# Patient Record
(demographics unavailable — no encounter records)

---

## 2025-05-19 NOTE — CONSULT LETTER
[Dear  ___] : Dear  [unfilled], [Please see my note below.] : Please see my note below. [Consult Closing:] : Thank you very much for allowing me to participate in the care of this patient.  If you have any questions, please do not hesitate to contact me. [Sincerely,] : Sincerely, [FreeTextEntry3] : ZACH Roth Certified Pediatric Nurse Practitioner  Pediatric Neurology  [facility]

## 2025-05-19 NOTE — PHYSICAL EXAM
[Well-appearing] : well-appearing [Normocephalic] : normocephalic [No dysmorphic facial features] : no dysmorphic facial features [No deformities] : no deformities [Alert] : alert [Well related, good eye contact] : well related, good eye contact [Conversant] : conversant [Normal speech and language] : normal speech and language [Follows instructions well] : follows instructions well [Full extraocular movements] : full extraocular movements [No nystagmus] : no nystagmus [Normal facial sensation to light touch] : normal facial sensation to light touch [No facial asymmetry or weakness] : no facial asymmetry or weakness [Gross hearing intact] : gross hearing intact [Equal palate elevation] : equal palate elevation [Good shoulder shrug] : good shoulder shrug [Normal tongue movement] : normal tongue movement [Normal axial and appendicular muscle tone] : normal axial and appendicular muscle tone [Gets up on table without difficulty] : gets up on table without difficulty [No pronator drift] : no pronator drift [Normal finger tapping and fine finger movements] : normal finger tapping and fine finger movements [No abnormal involuntary movements] : no abnormal involuntary movements [5/5 strength in proximal and distal muscles of arms and legs] : 5/5 strength in proximal and distal muscles of arms and legs [Walks and runs well] : walks and runs well [Good walking balance] : good walking balance [Normal gait] : normal gait [de-identified] : No respiratory distress noted.

## 2025-05-19 NOTE — ASSESSMENT
[FreeTextEntry1] : MARY is a 6-year-old girl with no PMHx. Presents to the office for difficulty concentrating and hyperactivity. Currently in 1st grade at [employer]. Attends a regular classroom without accommodations. Recently evaluated through the district, reports grade-level performance and denied an IEP. Whereas the school reports she is struggling, reads two levels behind, is easily distracted, and has difficulty completing tests on time. Non-focal exam. Will plan to do workup to r/o ADHD using [employer] forms and psychoeducational evaluation.

## 2025-05-19 NOTE — HISTORY OF PRESENT ILLNESS
[FreeTextEntry1] : MARY is a 6 year old female here for initial evaluation of inattention.   Early development: MARY was born full term via c section. she was discharged home with mother. she hit all early developmental milestones appropriately.  MARY attended day care program starting at 3 years old and then pre-school at age 4.     Educational assessment: Current Grade: 1st grade Current District: [facility] General ED/Any Accommodations/ICT in place: Currently in a regular classroom setting w/o accommodations in place.  Recently evaluated through district and doesn't qualify for IEP. District reports grade-level performance, whereas the school itself indicates MARY is struggling and reading 2 levels behind. Teachers report concerns regarding completing tests in a timely manner and MARY being easily distracted.   Home assessment: Lives at home w/ parents.   Inattention: Very easily distracted, needing frequent redirection, difficulty with multi-step instructions Hyperactivity: Fidgety Impulsivity:  Interrupts when others are speaking, has a hard time waiting. Will cry if something doesn't go her way.  Homework: She needs help and redirection w/ homework.    Social Concerns: -  Sleep: sleeps well Eating: eats a varied diet   Co- morbidities: No concern for depression, OCD. Can be anxious at times.  Other health concerns: Denies staring, twitching, seizure or seizure-like activity.   FAMILY HISTORY: Family history of ADHD: + Family history of anxiety or depression: +  Denies family history of cardiac conditions or early unexplained deaths.

## 2025-05-19 NOTE — PLAN
[FreeTextEntry1] : - Mother to send copy of most recent psychoeducational testing from school - [place] questionnaires given for parent and teacher -  Discussed use of Omega 3 fish oil and magnesium supplements.  - Discussed use of medications as well as side effects if accommodations do not improve school performance - Follow up 1 month to review [place] questionnaires as well as psychoeducational testing

## 2025-06-25 NOTE — PLAN
[FreeTextEntry1] : -  Discussed use of Omega 3 fish oil and magnesium supplements.  - Discussed use of medications as well as side effects if accommodations do not improve school performance - Discussed potential benefits of behavioral therapy - Letter for accommodations provided to parent. - Follow up 3 months.

## 2025-06-25 NOTE — HISTORY OF PRESENT ILLNESS
[FreeTextEntry1] : MARY is a 7 year old female here for f/u evaluation of inattention and hyperactivity.  Currently in 1st grade at [employer]. Attends a regular classroom without accommodations. Recently evaluated through the [employer], reports grade-level performance and denied an IEP.   f/u 06/25/2025 [place] forms reviewed:   Parents responses:  Inattention 4/9- (6/9).  +Hyperactivity 6/9 (6/9)  ODD: 0/8. (4/8) 19-26  Conduct disorder: 0/14 (3/14) 27-40 + Anxiety/ Depression: 3/7 (3/7) Overall performance:    Teachers responses: +Inattention 7/9- (6/9). + Hyperactivity 6/9 (6/9)  ODD/ Conduct: 0/10. (3/10) 19-28  Anxiety/ Depression:  5/7 (3/7)  Overall Performance: struggles w/ following directions, disrupting class and assignment completion.    _________________________________________________ Initial Visit: 05/19/2025 Early development: MARY was born full term via c section. she was discharged home with mother. she hit all early developmental milestones appropriately.  MARY attended day care program starting at 3 years old and then pre-school at age 4.     Educational assessment: Current Grade: 1st grade Current District: [employer] General ED/Any Accommodations/ICT in place: Currently in a regular classroom setting w/o accommodations in place.  Recently evaluated through district and doesn't qualify for IEP. District reports grade-level performance, whereas the school itself indicates MARY is struggling and reading 2 levels behind. Teachers report concerns regarding completing tests in a timely manner and MARY being easily distracted.   Home assessment: Lives at home w/ parents.   Inattention: Very easily distracted, needing frequent redirection, difficulty with multi-step instructions Hyperactivity: Fidgety Impulsivity:  Interrupts when others are speaking, has a hard time waiting. Will cry if something doesn't go her way.  Homework: She needs help and redirection w/ homework.    Social Concerns: -  Sleep: sleeps well Eating: eats a varied diet   Co- morbidities: No concern for depression, OCD. Can be anxious at times.  Other health concerns: Denies staring, twitching, seizure or seizure-like activity.   FAMILY HISTORY: Family history of ADHD: + Family history of anxiety or depression: +  Denies family history of cardiac conditions or early unexplained deaths.

## 2025-06-25 NOTE — CONSULT LETTER
[Dear  ___] : Dear  [unfilled], [Please see my note below.] : Please see my note below. [Consult Closing:] : Thank you very much for allowing me to participate in the care of this patient.  If you have any questions, please do not hesitate to contact me. [Sincerely,] : Sincerely, [FreeTextEntry3] : ZACH Roth Certified Pediatric Nurse Practitioner  Pediatric Neurology  [facility]

## 2025-06-25 NOTE — PHYSICAL EXAM
[Well-appearing] : well-appearing [Normocephalic] : normocephalic [No dysmorphic facial features] : no dysmorphic facial features [No deformities] : no deformities [Alert] : alert [Well related, good eye contact] : well related, good eye contact [Conversant] : conversant [Normal speech and language] : normal speech and language [Follows instructions well] : follows instructions well [Full extraocular movements] : full extraocular movements [No facial asymmetry or weakness] : no facial asymmetry or weakness [Gross hearing intact] : gross hearing intact [Normal tongue movement] : normal tongue movement [Gets up on table without difficulty] : gets up on table without difficulty [No abnormal involuntary movements] : no abnormal involuntary movements [Walks and runs well] : walks and runs well [Good walking balance] : good walking balance [Normal gait] : normal gait [de-identified] : No respiratory distress noted.

## 2025-06-25 NOTE — ASSESSMENT
[FreeTextEntry1] : MARY is a 7-year-old girl with no PMHx. Presents to the office for difficulty concentrating and hyperactivity. Currently in 1st grade at [employer]. Attends a regular classroom without accommodations. Recently evaluated through the district, reports grade-level performance and denied an IEP.   Based on initial evaluation as well as [employer] forms completed by both parents and teacher MARY meets criteria for a diagnosis of ADHD, predominately hyperactive type along w/ concerns of Anxiety/Depression noted on both forms. Academically, she is struggling and reads two levels behind. Discussed classroom accommodations prior to start of stimulants. Mother agreeable to plan. All questions answered.